# Patient Record
Sex: FEMALE | Race: WHITE | NOT HISPANIC OR LATINO | Employment: OTHER | URBAN - METROPOLITAN AREA
[De-identification: names, ages, dates, MRNs, and addresses within clinical notes are randomized per-mention and may not be internally consistent; named-entity substitution may affect disease eponyms.]

---

## 2023-06-15 ENCOUNTER — LAB (OUTPATIENT)
Dept: LAB | Facility: LAB | Age: 58
End: 2023-06-15
Payer: COMMERCIAL

## 2023-06-15 ENCOUNTER — OFFICE VISIT (OUTPATIENT)
Dept: PRIMARY CARE | Facility: CLINIC | Age: 58
End: 2023-06-15
Payer: COMMERCIAL

## 2023-06-15 VITALS
WEIGHT: 140 LBS | BODY MASS INDEX: 20.73 KG/M2 | HEIGHT: 69 IN | DIASTOLIC BLOOD PRESSURE: 72 MMHG | HEART RATE: 82 BPM | SYSTOLIC BLOOD PRESSURE: 108 MMHG

## 2023-06-15 DIAGNOSIS — R00.2 PALPITATION: ICD-10-CM

## 2023-06-15 DIAGNOSIS — R06.02 SOB (SHORTNESS OF BREATH) ON EXERTION: ICD-10-CM

## 2023-06-15 DIAGNOSIS — M79.89 LEG SWELLING: ICD-10-CM

## 2023-06-15 DIAGNOSIS — R53.83 LOW ENERGY: ICD-10-CM

## 2023-06-15 DIAGNOSIS — N95.1 VASOMOTOR SYMPTOMS DUE TO MENOPAUSE: ICD-10-CM

## 2023-06-15 DIAGNOSIS — F32.A DEPRESSION, UNSPECIFIED DEPRESSION TYPE: ICD-10-CM

## 2023-06-15 DIAGNOSIS — R42 DIZZINESS: ICD-10-CM

## 2023-06-15 DIAGNOSIS — R53.83 OTHER FATIGUE: ICD-10-CM

## 2023-06-15 DIAGNOSIS — R51.9 NONINTRACTABLE HEADACHE, UNSPECIFIED CHRONICITY PATTERN, UNSPECIFIED HEADACHE TYPE: Primary | ICD-10-CM

## 2023-06-15 DIAGNOSIS — R53.83 OTHER FATIGUE: Primary | ICD-10-CM

## 2023-06-15 LAB
ALANINE AMINOTRANSFERASE (SGPT) (U/L) IN SER/PLAS: 13 U/L (ref 7–45)
ALBUMIN (G/DL) IN SER/PLAS: 5 G/DL (ref 3.4–5)
ALKALINE PHOSPHATASE (U/L) IN SER/PLAS: 50 U/L (ref 33–110)
ANION GAP IN SER/PLAS: 11 MMOL/L (ref 10–20)
ASPARTATE AMINOTRANSFERASE (SGOT) (U/L) IN SER/PLAS: 18 U/L (ref 9–39)
BILIRUBIN TOTAL (MG/DL) IN SER/PLAS: 0.9 MG/DL (ref 0–1.2)
CALCIDIOL (25 OH VITAMIN D3) (NG/ML) IN SER/PLAS: 25 NG/ML
CALCIUM (MG/DL) IN SER/PLAS: 10 MG/DL (ref 8.6–10.3)
CARBON DIOXIDE, TOTAL (MMOL/L) IN SER/PLAS: 30 MMOL/L (ref 21–32)
CHLORIDE (MMOL/L) IN SER/PLAS: 102 MMOL/L (ref 98–107)
CREATININE (MG/DL) IN SER/PLAS: 0.72 MG/DL (ref 0.5–1.05)
ERYTHROCYTE DISTRIBUTION WIDTH (RATIO) BY AUTOMATED COUNT: 12.8 % (ref 11.5–14.5)
ERYTHROCYTE MEAN CORPUSCULAR HEMOGLOBIN CONCENTRATION (G/DL) BY AUTOMATED: 32.7 G/DL (ref 32–36)
ERYTHROCYTE MEAN CORPUSCULAR VOLUME (FL) BY AUTOMATED COUNT: 91 FL (ref 80–100)
ERYTHROCYTES (10*6/UL) IN BLOOD BY AUTOMATED COUNT: 5.34 X10E12/L (ref 4–5.2)
GFR FEMALE: >90 ML/MIN/1.73M2
GLUCOSE (MG/DL) IN SER/PLAS: 81 MG/DL (ref 74–99)
HEMATOCRIT (%) IN BLOOD BY AUTOMATED COUNT: 48.6 % (ref 36–46)
HEMOGLOBIN (G/DL) IN BLOOD: 15.9 G/DL (ref 12–16)
LEUKOCYTES (10*3/UL) IN BLOOD BY AUTOMATED COUNT: 5.8 X10E9/L (ref 4.4–11.3)
PLATELETS (10*3/UL) IN BLOOD AUTOMATED COUNT: 190 X10E9/L (ref 150–450)
POTASSIUM (MMOL/L) IN SER/PLAS: 3.8 MMOL/L (ref 3.5–5.3)
PROTEIN TOTAL: 7.5 G/DL (ref 6.4–8.2)
SODIUM (MMOL/L) IN SER/PLAS: 139 MMOL/L (ref 136–145)
THYROTROPIN (MIU/L) IN SER/PLAS BY DETECTION LIMIT <= 0.05 MIU/L: 1.12 MIU/L (ref 0.44–3.98)
UREA NITROGEN (MG/DL) IN SER/PLAS: 16 MG/DL (ref 6–23)

## 2023-06-15 PROCEDURE — 36415 COLL VENOUS BLD VENIPUNCTURE: CPT

## 2023-06-15 PROCEDURE — 99204 OFFICE O/P NEW MOD 45 MIN: CPT | Performed by: INTERNAL MEDICINE

## 2023-06-15 PROCEDURE — 82306 VITAMIN D 25 HYDROXY: CPT

## 2023-06-15 PROCEDURE — 83880 ASSAY OF NATRIURETIC PEPTIDE: CPT

## 2023-06-15 PROCEDURE — 85027 COMPLETE CBC AUTOMATED: CPT

## 2023-06-15 PROCEDURE — 86376 MICROSOMAL ANTIBODY EACH: CPT

## 2023-06-15 PROCEDURE — 84443 ASSAY THYROID STIM HORMONE: CPT

## 2023-06-15 PROCEDURE — 80053 COMPREHEN METABOLIC PANEL: CPT

## 2023-06-15 RX ORDER — VENLAFAXINE HYDROCHLORIDE 75 MG/1
75 CAPSULE, EXTENDED RELEASE ORAL DAILY
Qty: 30 CAPSULE | Refills: 1 | Status: SHIPPED | OUTPATIENT
Start: 2023-06-15 | End: 2023-08-14

## 2023-06-15 RX ORDER — CAFFEINE 200 MG
TABLET ORAL
COMMUNITY

## 2023-06-15 RX ORDER — LANOLIN ALCOHOL/MO/W.PET/CERES
100 CREAM (GRAM) TOPICAL DAILY
COMMUNITY

## 2023-06-15 RX ORDER — MULTIVITAMIN
1 TABLET ORAL DAILY
COMMUNITY

## 2023-06-15 NOTE — PROGRESS NOTES
Subjective   Patient ID: [id number]     Lorri Asif is a 58 y.o. female who presents for Establish Care (New patient [facility]), Foot Swelling, Dizziness, Rapid Heart Rate, head pressure, and  head swelling.    Current Outpatient Medications:     caffeine 200 mg, Take by mouth., Disp: , Rfl:     cyanocobalamin (Vitamin B-12) 1,000 mcg tablet, Take 100 mcg by mouth once daily., Disp: , Rfl:     multivitamin tablet, Take 1 tablet by mouth once daily., Disp: , Rfl:     tumeric-ging-olive-oreg-capryl 100 mg-150 mg- 50 mg-150 mg capsule, Take by mouth., Disp: , Rfl:   HPI  58-year-old female patient presenting to the office today to establish care.  Patient has not had an annual exam with her primary care physician in quite some time.  She told me that she saw an online provider who gave her a topical estrogen cream for management of her menopausal symptoms.  She stated that it was very helpful with her sleep and decreased her body aches and the hot flashes improved significantly and she felt she had more energy.  Then she started experiencing ringing in the ear that was significantly bothersome and lately she started experiencing more dizzy sensation and headache and pressure in her head as well as numb sensation in her cheeks.  She felt low energy fatigued and tired.  She had COVID in January and since then she does not feel the same.  She is experiencing episodes of palpitations mostly at night no chest pain but she most definitely experiencing shortness of breath at times.  Sometimes when she takes a shower after a hot shower she feels like she is about to pass out she has to sit down and she does experience palpitations during that time.    She told me she was treated for depression with antidepressants and she was tried on multiple antidepressants and she had a lot of intolerance and she has discontinued them completely on her own.    She also indicated that at 1 point she was treated by functional medicine for  "thyroid concerns and she was given hormones and then she started experiencing side effects and tachycardia and palpitations and she discontinued treatment of the thyroid medications.    At some point she told me she was diagnosed with also fibromyalgia and psoriasis for which she is not treated and she is not actively followed by dermatology.  Review of system was reviewed all normal except what is noted in HPI   Past Medical History:   Diagnosis Date    Anxiety     COVID-19     Depression       Objective   /80   Ht 1.753 m (5' 9\")   Wt 63.5 kg (140 lb)   BMI 20.67 kg/m²      Physical Exam  Constitutional:       Appearance: Normal appearance.   Cardiovascular:      Rate and Rhythm: Normal rate and regular rhythm.      Pulses: Normal pulses.      Heart sounds: Normal heart sounds.   Pulmonary:      Effort: Pulmonary effort is normal.      Breath sounds: Normal breath sounds.   Neurological:      Mental Status: She is alert.         Assessment/Plan   Problem List Items Addressed This Visit    None  58-year-old female patient with the following issues.    1.  Concerns regarding a sensation of dizziness and lightheadedness with palpitation and fullness and pressure in her head.  I worry about the possibility of orthostatic hypotension and dehydration and decreased oral intake.  I did check orthostatics in the office there was no significant evidence of blood pressure drop or increase in the heart rate.  Because of the palpitations and the lightheadedness sensation I would like to proceed with a cardiac monitor to try to assist the rhythm to rule out any abnormalities in the electricity of the heart.  Or any abnormal rhythm also an echocardiogram will be requested to assess for any valvular abnormalities that could be contributing to this presentation.    Lab work will be obtained to rule out anemia or thyroid abnormality specially with the patient's past history and vitamin D deficiency as well at ruling out " any underlying metallic abnormality.  We will discuss the results with the patient once we have available.    2-Postmenopausal symptoms and vasomotor symptoms including hot flashes as well as history of depression, I believe the patient would benefit from a trial of venlafaxine a prescription was sent to the pharmacy and I would like to see her back in 6 to 8 weeks for close follow-up.    Disposition I will see the patient back in the office within the next 8 to 10 weeks and sooner if needed.  Patient stated understanding all questions were addressed no other active issues or concerns.    Briseida Chance MD

## 2023-06-16 LAB
NATRIURETIC PEPTIDE B (PG/ML) IN SER/PLAS: 42 PG/ML (ref 0–99)
THYROPEROXIDASE AB (IU/ML) IN SER/PLAS: 223 IU/ML

## 2023-06-29 ENCOUNTER — APPOINTMENT (OUTPATIENT)
Dept: PRIMARY CARE | Facility: CLINIC | Age: 58
End: 2023-06-29
Payer: COMMERCIAL

## 2023-07-21 ENCOUNTER — APPOINTMENT (OUTPATIENT)
Dept: PRIMARY CARE | Facility: CLINIC | Age: 58
End: 2023-07-21
Payer: COMMERCIAL

## 2023-11-06 ENCOUNTER — OFFICE VISIT (OUTPATIENT)
Dept: NEUROLOGY | Facility: CLINIC | Age: 58
End: 2023-11-06
Payer: COMMERCIAL

## 2023-11-06 DIAGNOSIS — R42 DIZZY SPELLS: Primary | ICD-10-CM

## 2023-11-06 DIAGNOSIS — R20.0 FACIAL NUMBNESS: ICD-10-CM

## 2023-11-06 DIAGNOSIS — G43.109 MIGRAINE EQUIVALENT: ICD-10-CM

## 2023-11-06 PROCEDURE — 99205 OFFICE O/P NEW HI 60 MIN: CPT | Performed by: PSYCHIATRY & NEUROLOGY

## 2023-11-06 RX ORDER — METHYLPREDNISOLONE 4 MG/1
TABLET ORAL
Qty: 21 TABLET | Refills: 0 | Status: SHIPPED | OUTPATIENT
Start: 2023-11-06 | End: 2023-11-13

## 2023-11-06 NOTE — PROGRESS NOTES
I had a pleasure of seeing Ms. Lorri Smith in neurological consultation today for c/o dizzy spells and facial numbness following Covid-19 in January 2023. Lorri is a 59 y/o female who reports postural dizziness with making sudden changes of posture such as getting up quickly, bending forward, standing or sitting for long time, making sudden turns, etc. Sometimes palpitations may be a present but no chest pain, major shortness of breath or chest tightness. Certain activities can exacerbate symptoms which usually improve with rest. Symptoms can be waxing and waning in intensity and frequency but are present almost daily. Symptoms are usually mild to moderate, sometimes more severe.  No speech or language difficulties. No swallowing problems. No permanent focal weakness or numbness in extremities. No changes in bowels or bladder habits. No major gait or balance issues. No visual symptoms. Left sided headaches and facial numbness w/o sensory loss.    Review of Systems/ROS. Unless otherwise stated in this report the patient's positive and negative responses for review of systems (ROS) for constitutional, eyes, ENT, cardiovascular, respiratory, gastrointestinal/GI, neurological, psychiatric, genitourinary/, musculoskeletal, and integument systems and related systems to the presenting problem are either as stated in the history of present illness (HPI), or were not pertinent, or were negative for the symptoms and/or complaints related to the presenting medical problem.     IMAGING, CLINICAL NOTES AND TESTS/LABS: The patient labs, clinical notes, radiology reports, and other tests were obtained, personally reviewed by me, and summarized as applicable from the physician portal, electronic medical records systems and/or outside sources/medical data. Pertinent positive and negative findings were considered in medical decision making. Discussed with the patient and/or family members, when appropriate.    Exam:    On  physical examination the patient is fully alert and oriented and does not seem to be in acute distress, very cooperative with examination and history taking. Vital signs are stable. Regular heart rate and rhythm. No respiratory distress. Mood and affect are normal and appropriate. AOx3. Normal judgment and insight. Fund of knowledge WNL.    Head and neck examination shows the neck to be supple, without any carotid bruit, meningeal signs or lymphadenopathy. There is no major focal tenderness in the scalp, skull or cervical region. Range of motion of cervical spine is full. Lhermitte's phenomenon is negative. Face is symmetric with normal sensation, tongue is midline. Visual fields are full to confrontation. Extraocular muscle movements are intact, both in saccadic and pursuit movements. There is no nystagmus and patient denies any double vision during testing. Patient has a small pupils but they are equal and reactive. Funduscopy deferred secondary to small size of pupils. Hearing is well preserved.    Detailed neurological examination including informal mental status testing, motor examination, sensory examination, rapid alternating movements, and coordination are all in the normal limits. Cranial nerve examination is normal. Speech and language are normal. No significant hearing problems. Motor examination shows normal tone, bulk and strength of the muscles throughout. There is no pronator drift or orbiting. I did not see any tremors or fasciculations. No focal sensory deficits. No apparent trophic skin changes. No cyanosis. Peripheral pulses are present. Abdomen is nondistended. Deep tendon reflexes are 1+ throughout and symmetric. No pathological reflexes. Gait is stable. Tandem walk is normal and Romberg test is negative.     Plan:    I had a pleasure of seeing Ms. Lorri Smith in neurological consultation today for c/o dizzy spells and facial numbness following Covid-19 in January 2023. Normal exam. Migraine  "variant/equivalent (?). Talked about starting amitriptyline at bedtime but patient very hesitant (\"I am very sensitive to medications\").     [facility].    Medrol dose pack.    MRI/A next (?).    ANS/tilt next (?).    Patient will follow up with PCP for other medical problems. If desired, we would like to see the patient in the follow-up in the next 4-6 weeks, or sooner if there is any worsening or new symptoms dictate. In the meanwhile, the patient was advised to call us with any question or concerns. The patient was also advised to go to ER or call 911 with any sudden worsening of symptoms.    This office note was created with speech recognition Dragon  software. While the final product was checked for mistakes (proofread), it is possible that typographical, grammatical and spelling mistakes remain. If there is any confusion, please do not hesitate to reach out to the office for clarification.    If you have labs or other testing completed and have not been informed of results within one week, the tests were most likely normal and/or without significant changes and can be discussed during follow-up visit. Please call our office with any concerns. If you haven't done so, consider signing up for My [facility], the [facility] personal health record were you can access your own results. Ask the staff how you can get started.          "

## 2023-11-09 ENCOUNTER — APPOINTMENT (OUTPATIENT)
Dept: OTOLARYNGOLOGY | Facility: CLINIC | Age: 58
End: 2023-11-09
Payer: COMMERCIAL

## 2023-11-10 ENCOUNTER — APPOINTMENT (OUTPATIENT)
Dept: OTOLARYNGOLOGY | Facility: CLINIC | Age: 58
End: 2023-11-10
Payer: COMMERCIAL

## 2024-01-04 ENCOUNTER — APPOINTMENT (OUTPATIENT)
Dept: OTOLARYNGOLOGY | Facility: CLINIC | Age: 59
End: 2024-01-04
Payer: COMMERCIAL

## 2024-01-04 ENCOUNTER — APPOINTMENT (OUTPATIENT)
Dept: AUDIOLOGY | Facility: CLINIC | Age: 59
End: 2024-01-04
Payer: COMMERCIAL

## 2024-07-19 ENCOUNTER — APPOINTMENT (OUTPATIENT)
Dept: OTOLARYNGOLOGY | Facility: CLINIC | Age: 59
End: 2024-07-19
Payer: COMMERCIAL

## 2024-07-19 ENCOUNTER — APPOINTMENT (OUTPATIENT)
Dept: AUDIOLOGY | Facility: CLINIC | Age: 59
End: 2024-07-19
Payer: COMMERCIAL

## 2024-10-01 ENCOUNTER — APPOINTMENT (OUTPATIENT)
Dept: OTOLARYNGOLOGY | Facility: CLINIC | Age: 59
End: 2024-10-01
Payer: COMMERCIAL

## 2024-10-08 ENCOUNTER — CLINICAL SUPPORT (OUTPATIENT)
Dept: AUDIOLOGY | Facility: CLINIC | Age: 59
End: 2024-10-08
Payer: COMMERCIAL

## 2024-10-08 ENCOUNTER — APPOINTMENT (OUTPATIENT)
Dept: OTOLARYNGOLOGY | Facility: CLINIC | Age: 59
End: 2024-10-08
Payer: COMMERCIAL

## 2024-10-08 VITALS
TEMPERATURE: 97.4 F | BODY MASS INDEX: 21.42 KG/M2 | HEIGHT: 69 IN | SYSTOLIC BLOOD PRESSURE: 99 MMHG | DIASTOLIC BLOOD PRESSURE: 65 MMHG

## 2024-10-08 DIAGNOSIS — H92.02 LEFT EAR PAIN: Primary | ICD-10-CM

## 2024-10-08 DIAGNOSIS — H93.8X2 EAR PRESSURE, LEFT: ICD-10-CM

## 2024-10-08 DIAGNOSIS — M54.2 NECK PAIN: ICD-10-CM

## 2024-10-08 DIAGNOSIS — R47.01 EXPRESSIVE APHASIA: ICD-10-CM

## 2024-10-08 DIAGNOSIS — H93.12 TINNITUS OF LEFT EAR: Primary | ICD-10-CM

## 2024-10-08 PROCEDURE — 99204 OFFICE O/P NEW MOD 45 MIN: CPT | Performed by: OTOLARYNGOLOGY

## 2024-10-08 PROCEDURE — 1036F TOBACCO NON-USER: CPT | Performed by: OTOLARYNGOLOGY

## 2024-10-08 PROCEDURE — 92557 COMPREHENSIVE HEARING TEST: CPT | Performed by: AUDIOLOGIST

## 2024-10-08 PROCEDURE — 92550 TYMPANOMETRY & REFLEX THRESH: CPT | Performed by: AUDIOLOGIST

## 2024-10-08 ASSESSMENT — ENCOUNTER SYMPTOMS: OCCASIONAL FEELINGS OF UNSTEADINESS: 0

## 2024-10-08 ASSESSMENT — PAIN - FUNCTIONAL ASSESSMENT: PAIN_FUNCTIONAL_ASSESSMENT: 0-10

## 2024-10-08 ASSESSMENT — PAIN SCALES - GENERAL: PAINLEVEL_OUTOF10: 0 - NO PAIN

## 2024-10-08 NOTE — PROGRESS NOTES
Impression:  1. Left ear pain        2. Expressive aphasia        3. Neck pain             RECOMMENDATIONS/PLAN :  I reassured the patient that her ears are normal and both tympanic membranes are moving normally.  Due to her difficulty forming her words and expressing herself, I would like to refer her to neurology.  I want her to wear her nightguard every single night as she could be clenching or grinding her teeth and this can cause TMJ inflammation and subsequent ear pain as well.      **This electronic medical record note was created with the use of voice recognition software.  Despite proofreading, typographical or grammatical errors may be present that could affect meaning of content **    Subjective   Patient ID:     Lorri Smith is a 59 y.o. female who presents to the office today with multiple complaints.  She complains of ear pain and she has pain behind the ear with a sensation of fluid inside of her head.  She also states that she has been having trouble forming words and expressing herself.  She denies any other neurologic complaints or any difficulty swallowing, hemoptysis or hematemesis.  Apparently she has seen a neurologist and there was no diagnosis made.  She denies any blurred vision.    ROS:  A detailed 12 system review of systems is noted on the intake form has been reviewed with the patient with details noted in the HPI and scanned into the patient's medical record.    Objective     Past Medical History:   Diagnosis Date    Anxiety     COVID-19     Depression         Past Surgical History:   Procedure Laterality Date    BLADDER SURGERY       SECTION, LOW TRANSVERSE      EXCISIONAL HEMORRHOIDECTOMY      HEMORRHOID SURGERY  2014    Hemorrhoidectomy    HYSTERECTOMY  04/15/2015    Hysterectomy    HYSTERECTOMY      NM LASIX RENOGRAM      OTHER SURGICAL HISTORY  04/15/2015    Vaginal Surg Insertion Of Mesh For Pelvic Floor Repair    REFRACTIVE SURGERY  2014    Corneal LASIK     "    Allergies   Allergen Reactions    Penicillins Shortness of breath    Sulfa (Sulfonamide Antibiotics) Shortness of breath          Current Outpatient Medications:     caffeine 200 mg, Take by mouth., Disp: , Rfl:     cyanocobalamin (Vitamin B-12) 1,000 mcg tablet, Take 100 mcg by mouth once daily., Disp: , Rfl:     multivitamin tablet, Take 1 tablet by mouth once daily., Disp: , Rfl:     tumeric-ging-olive-oreg-capryl 100 mg-150 mg- 50 mg-150 mg capsule, Take by mouth., Disp: , Rfl:     venlafaxine XR (Effexor-XR) 75 mg 24 hr capsule, Take 1 capsule (75 mg) by mouth once daily. Take with food., Disp: 30 capsule, Rfl: 1     Tobacco Use: Low Risk  (10/8/2024)    Patient History     Smoking Tobacco Use: Never     Smokeless Tobacco Use: Never     Passive Exposure: Never        Alcohol Use: Not At Risk (11/20/2021)    Received from [facility], [facility]    AUDIT-C     Frequency of Alcohol Consumption: Never     Average Number of Drinks: Not on file     Frequency of Binge Drinking: Never        Social History     Substance and Sexual Activity   Drug Use Not on file        Physical Exam:  Visit Vitals  BP 99/65   Temp 36.3 °C (97.4 °F) (Temporal)   Ht 1.753 m (5' 9\")   BMI 21.42 kg/m²   Smoking Status Never   BSA 1.79 m²      General: Patient is alert, oriented, cooperative in no apparent distress.  Head: Normocephalic, atraumatic.  Eyes: PERRL, EOMI, Conjunctiva is clear. No nystagmus.  Ears: Right Ear-- Pinna is normal.  External auditory canal is patent. Tympanic membrane is [intact, translucent and has good mobility with my pneumatic otoscope. No effusion].  Mastoid is nontender.  Left ear-- Pinna is normal.  External auditory canal is patent. Tympanic membrane is [intact, translucent and has good mobility with my pneumatic otoscope.  No effusion].  Mastoid is nontender.  Nose: Septum is straight.  No septal perforation or lesions. No septal hematoma/ seroma.  No signs of bleeding.  Inferior turbinates " are normal.   No evidence of intranasal polyps.  No infectious drainage.  Throat:  Floor of mouth is clear, no masses.  Tongue appears normal, no lesions or masses. Gums, gingiva, buccal mucosa appear pink and moist, no lesions. Teeth are in good repair.  No obvious dental infections.  Peritonsillar regions appear symmetric without swelling.  Hard and soft palate appear normal, no obvious cleft. Uvula is midline.  Oropharynx: No lesions. Retropharyngeal wall is flat.  No active postnasal drip.  Neck: Supple,  no lymphadenopathy.  No masses.  Salivary Glands: Symmetric bilaterally.  No palpable masses.  No evidence of acute infection or salivary stones  Neurologic: Cranial Nerves 2-12 are grossly intact without focal deficits. Cerebellar function testing is normal.     Results:   I reviewed her recent audiogram and she has essentially normal hearing with a slight loss at the high frequencies and this is slightly worse in the left ear compared to right.  Both tympanic membranes have normal mobility on tympanometry.  Word recognition scores are 100% bilaterally.  Speech reception threshold is 5 dB bilaterally.    Procedure:   []    Sameer Comer, DO

## 2024-10-08 NOTE — PROGRESS NOTES
AUDIOLOGY ADULT AUDIOMETRIC EVALUATION      Name:  Lorri Smith  :  1965  Age:  59 y.o.  Date of Evaluation: 10/08/24    History:  Reason for visit:  Ms. Lorri Smith was seen today as part of the visit with Sameer Comer D.O. for an evaluation of hearing.   Chief Complaint   Patient presents with    Ear Pressure    imbalance    Tinnitus     Reported for the past year and a half she has been experiencing left sided sensations of ear/head inflammation and pressure. Reported she has noticed a tingle/numbness and pressure in the left side of her ear, running up into her head and down into the jaw area. Mentioned she has a sensation of fluid inside the left ear/head. Stated she does have some left sided ear pressure with a feeling of water inside the ear. Stated at times she feels like her entire head is foggy.   She has reported numerous  left sided symptoms to include a feeling of a watery left eye, numbness in the jaw/ear and a blocked sensation on the left. She denied any facial droop or new onset of headaches.   Mentioned she has noticed some difficulty with her speech and a sensation of imbalance at times.   Reported she has a left sided constant tonal tinnitus.   Denied any hearing loss or difficulty communicating.   Denied any otalgia, aural fullness, tinnitus, ear pressure, dizziness/vertigo, ear surgery, recent ear/sinus infections, recent falls, significant noise exposure, sinus/throat concerns, ear drainage, or sudden hearing loss.    EVALUATION     See Audiogram    RESULTS:    Otoscopic Evaluation:   Right Ear: Otoscopy revealed a clear healthy canal and a healthy tympanic membrane was visualized.   Left Ear: Otoscopy revealed a clear healthy canal and a healthy tympanic membrane was visualized.     Immittance:  Immittance Measures: 226 Hz   Right Ear: Tympanometric testing revealed a normal type A tympanogram with normal middle ear pressure and normal static compliance.  Left Ear: Tympanometric  testing revealed a normal type A tympanogram with normal middle ear pressure and normal static compliance.    Right Ear: Ipsilateral acoustic reflexes were present at, 500-4,000 Hz, at expected sensation levels.  Left Ear: Ipsilateral acoustic reflexes were present at, 1,000 Hz, at an expected sensation levels and absent at 500, 2,000 and 4,000 Hz.    Test technique:  Pure Tone Audiometry via inserts    Reliability:   excellent    Pure Tone Audiometry:    Right Ear: Audiometric testing indicated normal peripheral hearing sensitivity from 125-8,000 Hz.   Left Ear:   Audiometric testing indicated normal peripheral hearing sensitivity through 2,000 Hz, with near normal hearing sensitivity above.       Speech Audiometry:   Right Ear:  Speech Reception Threshold (SRT) was obtained at 5 dBHL                  Word Recognition scores were excellent (100%) in quiet when words were presented at 45 dBHL  Left Ear:  Speech Reception Threshold (SRT) was obtained at  5 dBHL                  Word Recognition scores were excellent (100%) in quiet when words were presented at 45 dBHL  Testing was performed with recorded NU-6 speech words in quiet. Speech thresholds were in good agreement with the pure tone averages in each ear.     IMPRESSIONS:  Today's test results are normal hearing sensitivity.  The patient was counseled with regard to the findings.    RECOMMENDATIONS:  * Continue medical follow up with Sameer Comer D.O.  * Retest as medically indicated, or sooner if a change in hearing sensitivity or tinnitus is noticed.   * Wear hearing protection while in the presence of loud sounds.   * Use tinnitus coping strategies as needed, such as sound apps on a smart phone, utilizing calming noise in the room, running a fan at night, etc.   * Use effective communication strategies such as asking the speaker to gain attention prior to speaking, speaking in the same room, repeating words that were heard, etc.    PATIENT EDUCATION:    Discussed results and recommendations with the patient and a copy of the hearing test was provided.  Questions were addressed and the patient was encouraged to contact our department should concerns arise.  The patient was seen from  2:30-3:00 pm.

## 2024-10-10 ENCOUNTER — APPOINTMENT (OUTPATIENT)
Dept: PRIMARY CARE | Facility: CLINIC | Age: 59
End: 2024-10-10
Payer: COMMERCIAL